# Patient Record
Sex: MALE | Race: OTHER | URBAN - METROPOLITAN AREA
[De-identification: names, ages, dates, MRNs, and addresses within clinical notes are randomized per-mention and may not be internally consistent; named-entity substitution may affect disease eponyms.]

---

## 2022-10-04 ENCOUNTER — HOSPITAL ENCOUNTER (EMERGENCY)
Age: 42
Discharge: HOME | End: 2022-10-04
Payer: COMMERCIAL

## 2022-10-04 VITALS
RESPIRATION RATE: 17 BRPM | HEART RATE: 75 BPM | TEMPERATURE: 98.3 F | DIASTOLIC BLOOD PRESSURE: 76 MMHG | OXYGEN SATURATION: 98 % | WEIGHT: 225 LBS | BODY MASS INDEX: 34.1 KG/M2 | HEIGHT: 68 IN | SYSTOLIC BLOOD PRESSURE: 117 MMHG

## 2022-10-04 DIAGNOSIS — B34.9 VIRAL ILLNESS: ICD-10-CM

## 2022-10-04 DIAGNOSIS — J02.9 VIRAL PHARYNGITIS: Primary | ICD-10-CM

## 2022-10-04 LAB
SARS-COV-2, NAAT: NOT DETECTED
STREP GRP A PCR: NEGATIVE

## 2022-10-04 PROCEDURE — 87880 STREP A ASSAY W/OPTIC: CPT

## 2022-10-04 PROCEDURE — 87635 SARS-COV-2 COVID-19 AMP PRB: CPT

## 2022-10-04 PROCEDURE — 99283 EMERGENCY DEPT VISIT LOW MDM: CPT

## 2022-10-04 NOTE — ED PROVIDER NOTES
[facility]  Department of Emergency Medicine   ED  Encounter Note  Admit Date/RoomTime: 10/4/2022  8:11 AM  ED Room: [place] [address]  NAME: Miguel Brito  : 1980  MRN: [id number]     Chief Complaint:  Generalized Body Aches and Pharyngitis (X 2-3 days)    HISTORY OF PRESENT ILLNESS        Miguel Brito is a 46 y.o. male who presents to the ED with a complaint of body aches and sore throat for the past 2 to 3 days. Patient complains of a bad sore throat. Hurts to swallow. Both of his ears feel itchy and clogged. All his muscles and \"bones\" ache. Positive cough. Patient is not sure if he has had a fever. He denies shortness of breath. Denies nausea, vomiting and diarrhea. Patient is concerned for COVID illness. Symptoms are moderate in severity. Did take an over-the-counter medicine for the body aches. ROS   Pertinent positives and negatives are stated within HPI, all other systems reviewed and are negative. Past Medical History:  has no past medical history on file. Surgical History:  has no past surgical history on file. Social History:  reports that he has never smoked. He has never used smokeless tobacco. He reports that he does not use drugs. Family History: family history is not on file. Allergies: Patient has no known allergies. PHYSICAL EXAM   Oxygen Saturation Interpretation: Normal on room air analysis. ED Triage Vitals   BP Temp Temp Source Heart Rate Resp SpO2 Height Weight   10/04/22 0821 10/04/22 0821 10/04/22 0821 10/04/22 0756 10/04/22 0756 10/04/22 0756 10/04/22 0821 10/04/22 0821   117/76 98.3 °F (36.8 °C) Oral 75 17 98 % 5' 8\" (1.727 m) 225 lb (102.1 kg)         General:  NAD. Alert and Oriented. Well-appearing. Skin:  Warm, dry. No rashes. Head:  Normocephalic. Atraumatic. Eyes:  EOMI. Conjunctiva normal.  ENT:  Oral mucosa moist.  Airway patent.   Posterior pharynx with moderate erythema and bilateral tonsillar swelling. Trace exudate. Uvula is midline with equal rise. Bilateral TMs with minimal erythema, negative bulging. Neck:  Supple. Normal ROM. Respiratory:  No respiratory distress. No labored breathing. Lungs clear without rales, rhonchi or wheezing. Cardiovascular:  Regular rate. No Murmur. No peripheral edema. Extremities warm and good color. Extremities:  Normal ROM. Nontender to palpation. Atraumatic. Back:  Normal ROM. Nontender to palpation. Neuro:  Alert and Oriented to person, place, time and situation. Normal LOC. Moves all extremities. Speech fluent. Psych:  Calm and Cooperative. Normal thought process. Normal judgement. Lab / Imaging Results   (All laboratory and radiology results have been personally reviewed by myself)  Labs:  Results for orders placed or performed during the hospital encounter of 10/04/22   COVID-19, Rapid    Specimen: Nasopharyngeal Swab   Result Value Ref Range    SARS-CoV-2, NAAT Not Detected Not Detected   Strep Screen Group A Throat    Specimen: Throat   Result Value Ref Range    Strep Grp A PCR Negative Negative     Imaging: All Radiology results interpreted by Radiologist unless otherwise noted. No orders to display       ED Course / Medical Decision Making   Medications - No data to display     Re-examination:  10/4/22       Time: Patients condition . Consult(s):   None    Procedure(s):   None    MDM:   COVID is negative. Strep is negative. Explained to patient is a viral illness with a sore throat. Recommended Motrin and Tylenol. Rest and fluids. Work excuse provided    Plan of Care/Counseling:  [facility] in addition to providing specific details for the plan of care and counseling regarding the diagnosis and prognosis. Questions are answered at this time and are agreeable with the plan. ASSESSMENT     1. Viral pharyngitis    2.  Viral illness      PLAN   Discharged home.  Patient condition is good    New Medications     New Prescriptions    No medications on file     Electronically signed by UNA Grover   DD: 10/4/22  **This report was transcribed using voice recognition software. Every effort was made to ensure accuracy; however, inadvertent computerized transcription errors may be present.   END OF ED PROVIDER NOTE       Sukhi Waggoner, [id number]  10/04/22 [id number]

## 2022-10-04 NOTE — Clinical Note
Ainsley Hernandez was seen and treated in our emergency department on 10/4/2022. He may return to work on 10/06/2022. If you have any questions or concerns, please don't hesitate to call.       Luther Perdomo [place]